# Patient Record
Sex: MALE | Race: WHITE | NOT HISPANIC OR LATINO
[De-identification: names, ages, dates, MRNs, and addresses within clinical notes are randomized per-mention and may not be internally consistent; named-entity substitution may affect disease eponyms.]

---

## 2017-07-27 ENCOUNTER — CLINICAL ADVICE (OUTPATIENT)
Age: 72
End: 2017-07-27

## 2017-07-27 ENCOUNTER — APPOINTMENT (OUTPATIENT)
Dept: GASTROENTEROLOGY | Facility: CLINIC | Age: 72
End: 2017-07-27
Payer: MEDICARE

## 2017-07-27 DIAGNOSIS — K62.5 HEMORRHAGE OF ANUS AND RECTUM: ICD-10-CM

## 2017-07-27 PROCEDURE — 43239 EGD BIOPSY SINGLE/MULTIPLE: CPT

## 2017-07-27 PROCEDURE — 45378 DIAGNOSTIC COLONOSCOPY: CPT

## 2017-07-28 ENCOUNTER — RESULT REVIEW (OUTPATIENT)
Age: 72
End: 2017-07-28

## 2017-07-31 LAB
BASOPHILS # BLD AUTO: 0.03 K/UL
BASOPHILS NFR BLD AUTO: 0.3 %
EOSINOPHIL # BLD AUTO: 0 K/UL
EOSINOPHIL NFR BLD AUTO: 0 %
HCT VFR BLD CALC: 37.3 %
HGB BLD-MCNC: 12.6 G/DL
IMM GRANULOCYTES NFR BLD AUTO: 0.2 %
LYMPHOCYTES # BLD AUTO: 2.55 K/UL
LYMPHOCYTES NFR BLD AUTO: 22.7 %
MAN DIFF?: NORMAL
MCHC RBC-ENTMCNC: 31.2 PG
MCHC RBC-ENTMCNC: 33.8 GM/DL
MCV RBC AUTO: 92.3 FL
MONOCYTES # BLD AUTO: 0.96 K/UL
MONOCYTES NFR BLD AUTO: 8.5 %
NEUTROPHILS # BLD AUTO: 7.68 K/UL
NEUTROPHILS NFR BLD AUTO: 68.3 %
PLATELET # BLD AUTO: 245 K/UL
RBC # BLD: 4.04 M/UL
RBC # FLD: 13.3 %
WBC # FLD AUTO: 11.24 K/UL

## 2017-09-05 ENCOUNTER — APPOINTMENT (OUTPATIENT)
Dept: GASTROENTEROLOGY | Facility: CLINIC | Age: 72
End: 2017-09-05
Payer: MEDICARE

## 2017-09-05 DIAGNOSIS — Z80.7 FAMILY HISTORY OF OTHER MALIGNANT NEOPLASMS OF LYMPHOID, HEMATOPOIETIC AND RELATED TISSUES: ICD-10-CM

## 2017-09-05 DIAGNOSIS — Z78.9 OTHER SPECIFIED HEALTH STATUS: ICD-10-CM

## 2017-09-05 DIAGNOSIS — A04.8 OTHER SPECIFIED BACTERIAL INTESTINAL INFECTIONS: ICD-10-CM

## 2017-09-05 DIAGNOSIS — Z82.49 FAMILY HISTORY OF ISCHEMIC HEART DISEASE AND OTHER DISEASES OF THE CIRCULATORY SYSTEM: ICD-10-CM

## 2017-09-05 PROCEDURE — 99214 OFFICE O/P EST MOD 30 MIN: CPT

## 2017-09-05 PROCEDURE — 99203 OFFICE O/P NEW LOW 30 MIN: CPT

## 2017-09-05 RX ORDER — VALSARTAN 160 MG/1
160 TABLET, COATED ORAL
Qty: 90 | Refills: 0 | Status: ACTIVE | COMMUNITY
Start: 2017-03-13

## 2017-09-05 RX ORDER — ALBUTEROL SULFATE 90 UG/1
108 (90 BASE) AEROSOL, METERED RESPIRATORY (INHALATION)
Qty: 18 | Refills: 0 | Status: ACTIVE | COMMUNITY
Start: 2017-02-27

## 2017-09-05 RX ORDER — ZOLPIDEM TARTRATE 10 MG/1
10 TABLET ORAL
Qty: 30 | Refills: 0 | Status: ACTIVE | COMMUNITY
Start: 2017-02-27

## 2017-09-05 RX ORDER — ROSUVASTATIN CALCIUM 40 MG/1
40 TABLET, FILM COATED ORAL
Qty: 90 | Refills: 0 | Status: ACTIVE | COMMUNITY
Start: 2017-06-28

## 2017-09-05 RX ORDER — AZITHROMYCIN 250 MG/1
250 TABLET, FILM COATED ORAL
Qty: 6 | Refills: 0 | Status: ACTIVE | COMMUNITY
Start: 2017-05-22

## 2017-09-05 RX ORDER — COLESEVELAM HYDROCHLORIDE 625 MG/1
625 TABLET, FILM COATED ORAL
Qty: 180 | Refills: 0 | Status: ACTIVE | COMMUNITY
Start: 2017-04-08

## 2017-09-06 PROBLEM — Z80.7 FAMILY HISTORY OF MULTIPLE MYELOMA: Status: ACTIVE | Noted: 2017-09-05

## 2017-09-06 PROBLEM — Z82.49 FAMILY HISTORY OF CARDIOMEGALY: Status: ACTIVE | Noted: 2017-09-05

## 2017-09-06 PROBLEM — Z78.9 SOCIAL ALCOHOL USE: Status: ACTIVE | Noted: 2017-09-05

## 2017-09-06 RX ORDER — LANSOPRAZOLE 30 MG/1
30 CAPSULE, DELAYED RELEASE ORAL
Qty: 28 | Refills: 0 | Status: ACTIVE | COMMUNITY
Start: 2017-09-06 | End: 1900-01-01

## 2017-09-06 RX ORDER — CLARITHROMYCIN 500 MG/1
500 TABLET, FILM COATED ORAL TWICE DAILY
Qty: 28 | Refills: 0 | Status: ACTIVE | COMMUNITY
Start: 2017-09-06 | End: 1900-01-01

## 2017-09-06 RX ORDER — LANSOPRAZOLE, AMOXICILLIN, CLARITHROMYCIN 30-500-500
KIT ORAL
Qty: 36 | Refills: 0 | Status: DISCONTINUED | COMMUNITY
Start: 2017-09-05 | End: 2017-09-06

## 2017-09-06 RX ORDER — AMOXICILLIN 500 MG/1
500 TABLET, FILM COATED ORAL
Qty: 52 | Refills: 0 | Status: ACTIVE | COMMUNITY
Start: 2017-09-06 | End: 1900-01-01

## 2020-09-21 ENCOUNTER — APPOINTMENT (OUTPATIENT)
Age: 75
End: 2020-09-21
Payer: MEDICARE

## 2020-09-21 VITALS
HEART RATE: 88 BPM | SYSTOLIC BLOOD PRESSURE: 118 MMHG | DIASTOLIC BLOOD PRESSURE: 60 MMHG | OXYGEN SATURATION: 98 % | WEIGHT: 191 LBS | TEMPERATURE: 97.2 F | RESPIRATION RATE: 17 BRPM

## 2020-09-21 DIAGNOSIS — K57.90 DIVERTICULOSIS OF INTESTINE, PART UNSPECIFIED, W/OUT PERFORATION OR ABSCESS W/OUT BLEEDING: ICD-10-CM

## 2020-09-21 DIAGNOSIS — R10.84 GENERALIZED ABDOMINAL PAIN: ICD-10-CM

## 2020-09-21 PROCEDURE — 99214 OFFICE O/P EST MOD 30 MIN: CPT

## 2020-09-21 NOTE — PHYSICAL EXAM
[General Appearance - Alert] : alert [General Appearance - In No Acute Distress] : in no acute distress [General Appearance - Well Nourished] : well nourished [General Appearance - Well Developed] : well developed [Sclera] : the sclera and conjunctiva were normal [Outer Ear] : the ears and nose were normal in appearance [Neck Appearance] : the appearance of the neck was normal [] : no respiratory distress [Exaggerated Use Of Accessory Muscles For Inspiration] : no accessory muscle use [Abnormal Walk] : normal gait [Musculoskeletal - Swelling] : no joint swelling seen [Skin Color & Pigmentation] : normal skin color and pigmentation [Skin Turgor] : normal skin turgor [Oriented To Time, Place, And Person] : oriented to person, place, and time [Impaired Insight] : insight and judgment were intact [Affect] : the affect was normal

## 2020-09-22 LAB — UREA BREATH TEST QL: NEGATIVE

## 2020-09-23 PROBLEM — K57.90 DIVERTICULOSIS: Status: ACTIVE | Noted: 2020-09-23

## 2020-09-23 NOTE — ASSESSMENT
[FreeTextEntry1] :  s/p minor diverticular bleed, index. Resolved. \par - Conservative management, reassurance, considering resolving diverticulitis without complication - will hold off on empiric antibiotics for now\par \par CT scan next week if fails to improve\par awaiting labs from Cardiologist, reportedly nml, hold NDAIDs\par h. pylori breath test pending

## 2020-09-23 NOTE — HISTORY OF PRESENT ILLNESS
[de-identified] : 75-year-old retired  and father to a [facility], seen by me after colonoscopy  rectal bleeding that proved to be diverticular in nature, first episode, uncomplicated, no need for transfusion. Had a colonoscope under routine circumstances that revealed diverticulosis, Was called by his son and the week of July 20 with reports of hematochezia. We arrange for direct access upper and lower endoscopy which revealed nonspecific gastritis, ultimately H. pylori positive, and pandiverticulosis with old blood throughout the colon, no evidence of active bleeding.\par \par Now here with vague ? improving non localizing abdminal "discomfort" without bleeding or change in bowel habits and single episode of fever to 101.4 No pulmonary, URI sxs or change in taste/smell. Has been isolating, no high risk contacts, spending time in [place] and aiming to migrate to [facility] later this fall/winter as routine.\par \par 9/21/20\par - In March had rectal bleeding - was taking advil. stopped and bleeding stopped \par - August - very nauseous after dinner, dry heaving and accompanied by chills. negative for COVID. much milder version in few weeks \par - September 10th saw cardiologist- no chest pain but abdominal pain (went back to taking advil and aspirin at night) started celecoxib and nexium for 4-5 days then got nauseous. \par - Saturday night woke up 3 in the morning. general abdominal pain all over. aching. all day yesterday couldn’t move. mild fever (101). \par - today no fever and feels ok besides some lethargy. no blood in stool anymore, normal bowel movements \par - less of an appetite and some bloating \par - Dr. Aly Weldon \par - stopped nexium because felt so sick- created nausea \par \par After the colonoscopy, I ordered a hemoglobin which revealed essentially no change from his baseline, he was stable and the bleeding completely is subsided within 24 hours.\par Since this examination he has been well, will be traveling to [place] over the next few weeks.

## 2020-09-28 ENCOUNTER — APPOINTMENT (OUTPATIENT)
Dept: CT IMAGING | Facility: CLINIC | Age: 75
End: 2020-09-28

## 2021-05-25 ENCOUNTER — NON-APPOINTMENT (OUTPATIENT)
Age: 76
End: 2021-05-25

## 2021-06-09 ENCOUNTER — NON-APPOINTMENT (OUTPATIENT)
Age: 76
End: 2021-06-09

## 2021-06-09 ENCOUNTER — APPOINTMENT (OUTPATIENT)
Dept: OPHTHALMOLOGY | Facility: CLINIC | Age: 76
End: 2021-06-09
Payer: MEDICARE

## 2021-06-09 PROCEDURE — 92134 CPTRZ OPH DX IMG PST SGM RTA: CPT

## 2021-06-09 PROCEDURE — 92004 COMPRE OPH EXAM NEW PT 1/>: CPT

## 2021-06-23 ENCOUNTER — NON-APPOINTMENT (OUTPATIENT)
Age: 76
End: 2021-06-23

## 2021-06-23 ENCOUNTER — APPOINTMENT (OUTPATIENT)
Dept: OPHTHALMOLOGY | Facility: CLINIC | Age: 76
End: 2021-06-23
Payer: MEDICARE

## 2021-06-23 PROCEDURE — 92012 INTRM OPH EXAM EST PATIENT: CPT

## 2021-07-14 ENCOUNTER — APPOINTMENT (OUTPATIENT)
Dept: OPHTHALMOLOGY | Facility: CLINIC | Age: 76
End: 2021-07-14
Payer: MEDICARE

## 2021-07-14 ENCOUNTER — NON-APPOINTMENT (OUTPATIENT)
Age: 76
End: 2021-07-14

## 2021-07-14 PROCEDURE — 92012 INTRM OPH EXAM EST PATIENT: CPT

## 2021-10-15 ENCOUNTER — OFFICE VISIT (OUTPATIENT)
Age: 76
End: 2021-10-15

## 2021-12-20 ENCOUNTER — TELEPHONE ENCOUNTER (OUTPATIENT)
Dept: URBAN - METROPOLITAN AREA CLINIC 9 | Facility: CLINIC | Age: 76
End: 2021-12-20

## 2022-07-30 ENCOUNTER — TELEPHONE ENCOUNTER (OUTPATIENT)
Age: 77
End: 2022-07-30

## 2022-07-31 ENCOUNTER — TELEPHONE ENCOUNTER (OUTPATIENT)
Age: 77
End: 2022-07-31